# Patient Record
Sex: FEMALE | Race: WHITE | NOT HISPANIC OR LATINO | Employment: OTHER | URBAN - METROPOLITAN AREA
[De-identification: names, ages, dates, MRNs, and addresses within clinical notes are randomized per-mention and may not be internally consistent; named-entity substitution may affect disease eponyms.]

---

## 2022-04-07 ENCOUNTER — HISTORICAL (OUTPATIENT)
Dept: ADMINISTRATIVE | Facility: HOSPITAL | Age: 72
End: 2022-04-07
Payer: COMMERCIAL

## 2022-04-23 VITALS
DIASTOLIC BLOOD PRESSURE: 90 MMHG | HEIGHT: 63 IN | BODY MASS INDEX: 34.84 KG/M2 | WEIGHT: 196.63 LBS | SYSTOLIC BLOOD PRESSURE: 136 MMHG

## 2022-05-23 PROBLEM — E66.9 OBESITY: Status: ACTIVE | Noted: 2022-05-23

## 2022-05-23 PROBLEM — R51.9 DAILY HEADACHE: Chronic | Status: ACTIVE | Noted: 2022-05-23

## 2022-05-23 PROBLEM — R51.9 SINUS HEADACHE: Chronic | Status: ACTIVE | Noted: 2022-05-23

## 2022-05-23 PROBLEM — G25.81 RESTLESS LEGS SYNDROME: Status: ACTIVE | Noted: 2022-05-23

## 2022-05-23 PROBLEM — E66.9 OBESITY: Chronic | Status: ACTIVE | Noted: 2022-05-23

## 2022-05-23 PROBLEM — G25.81 RESTLESS LEGS SYNDROME: Chronic | Status: ACTIVE | Noted: 2022-05-23

## 2022-05-23 PROBLEM — R51.9 SINUS HEADACHE: Status: ACTIVE | Noted: 2022-05-23

## 2022-05-23 PROBLEM — G43.019 COMMON MIGRAINE WITH INTRACTABLE MIGRAINE: Status: ACTIVE | Noted: 2022-05-23

## 2022-05-23 PROBLEM — R51.9 DAILY HEADACHE: Status: ACTIVE | Noted: 2022-05-23

## 2022-05-23 PROBLEM — G43.019 COMMON MIGRAINE WITH INTRACTABLE MIGRAINE: Chronic | Status: ACTIVE | Noted: 2022-05-23

## 2022-07-25 DIAGNOSIS — G43.909 MIGRAINE WITHOUT STATUS MIGRAINOSUS, NOT INTRACTABLE, UNSPECIFIED MIGRAINE TYPE: Primary | ICD-10-CM

## 2022-07-25 RX ORDER — AMITRIPTYLINE HYDROCHLORIDE 50 MG/1
50 TABLET, FILM COATED ORAL NIGHTLY PRN
Qty: 30 TABLET | Refills: 3 | OUTPATIENT
Start: 2022-07-25 | End: 2022-07-25 | Stop reason: SDUPTHER

## 2022-07-25 RX ORDER — AMITRIPTYLINE HYDROCHLORIDE 50 MG/1
50 TABLET, FILM COATED ORAL NIGHTLY PRN
Qty: 30 TABLET | Refills: 4 | OUTPATIENT
Start: 2022-07-25 | End: 2022-08-30 | Stop reason: SDUPTHER

## 2022-08-29 NOTE — PROGRESS NOTES
[facility] Neurology follow-up Office Visit Note    Last Visit Date:  February 14, 2022  Current Visit Date:  08/30/2022    Chief Complaint:     Chief Complaint   Patient presents with    Headache     States headaches are better since b/p meds were increase       History of Present Illness:      This is 72 y.o. female with history of chronic daily headache, mood disorder, hypertension, hypothyroidism, who is referred for chronic migraine without aura, sinus headaches, restless leg syndrome.  Still waking up with a headache in the center of her forehead. Headache is better with BP management.      Patient has been experiencing headaches her entire life.  It is located in the forehead area, bandlike headache in the frontal and maxilla region, daily, constant, with mild phonophobia, without nausea.  I have previously put her on Geodon while she was seeing [facility] because she was reporting visual hallucinations at home.     Not sleeping well.      Headache frequency:  2 migraine headache days per month.     Current medications:  Metoprolol 100 mg twice a day  Amitriptyline 50 mg once at night  Galcanezumab 120 mg her 1 mL once per month (July 12/20/2021 to present) - stopped taking   Sumatriptan 100 mg twice a day as needed for migraine headaches  Pramipexole 1 mg once before bedtime  Tizanidine 2 mg 3 times a day - not taking       Medications:     Current Outpatient Medications on File Prior to Visit   Medication Sig Dispense Refill    amitriptyline (ELAVIL) 50 MG tablet Take 1 tablet (50 mg total) by mouth nightly as needed for Insomnia. 30 tablet 4    chlorhexidine (PERIDEX) 0.12 % solution Take 15 mLs by mouth.      omeprazole (PRILOSEC) 20 MG capsule Take 20 mg by mouth.      pramipexole (MIRAPEX) 1 MG tablet Take 1 mg by mouth.      sumatriptan (IMITREX) 100 MG tablet   See Instructions, TAKE 1 TABLET BY MOUTH EVERY DAY WHEN NEEDED FOR MIGRAINE HEADACHE. MAY REPEAT DOSE AFTER 2 HOURS UP TO A MAXIMUM OF 200MG IN 24 HOURS, #  9 tab(s), 6 Refill(s), Pharmacy: [facility]      tiZANidine 2 mg Cap Take 2 mg by mouth.      triamcinolone acetonide 0.1% (KENALOG) 0.1 % ointment Apply topically.       No current facility-administered medications on file prior to visit.       Labs:     No results found for this or any previous visit.    Studies:       not applicable    Review of Systems:     Review of Systems   All other systems reviewed and are negative.    Physical Exams:     Vitals:    08/30/22 1358   BP: (!) 138/92   Pulse: 78   Resp: 18   Temp: 98.8 °F (37.1 °C)       Physical Exam  Vitals and nursing note reviewed.   Constitutional:       Appearance: Normal appearance.   HENT:      Head: Normocephalic and atraumatic.      Nose: Nose normal.      Mouth/Throat:      Mouth: Mucous membranes are moist.      Pharynx: Oropharynx is clear.   Eyes:      Conjunctiva/sclera: Conjunctivae normal.   Cardiovascular:      Rate and Rhythm: Normal rate and regular rhythm.      Pulses: Normal pulses.   Pulmonary:      Effort: Pulmonary effort is normal.      Breath sounds: Normal breath sounds.   Abdominal:      General: Abdomen is flat.   Musculoskeletal:         General: Normal range of motion.      Cervical back: Normal range of motion.   Skin:     General: Skin is warm.   Neurological:      Mental Status: She is alert.       Comprehensive Neurological Exam:  Mental Status: Alert Oriented to Self, Date, and Place. Naming, repetition, reading, and writing wnl. Comprehension wnl. No dysarthria.   CN II - XII: MJ, VA grossly intact to finger counting at 6 ft, VFFC, No ptosis OU, EOMI without nystagmus, LT/Temp symmetric in CN V1-3 distribution, Hearing grossly intact, Face Symmetric, Tongue and Uvula midline, Trapezius symmetric bilateral.   Motor: tone and bulk wnl throughout, no abnormal involuntary or voluntary movements, 5/5 to confrontation, Fine finger movements wnl b/l, No pronator drift.   Sensory: LT, Proprioception, Vibration, PP, Temp  symmetric. No sensory simultagnosia.   Reflexes: 2+ throughout, plantar reflexes downward bilateral.   Cerebellar: FNF wnl b/l, RAHM wnl b/l  Gait: normal.     Assessment:     This is 72 y.o. female with history of chronic daily headache, mood disorder, hypertension, hypothyroidism, who is referred for medically intractable chronic migraine without aura, sinus headache, restless leg syndrome.   Doing better with increase in Metoprolol 100 mg twice a day.     Problem List Items Addressed This Visit          Neuro    Common migraine with intractable migraine - Primary (Chronic)    Daily headache (Chronic)    Restless legs syndrome (Chronic)    Sinus headache (Chronic)       Endocrine    Obesity (Chronic)       Plan:     [] Continue with pramipexole 1 mg once a night  [] Continue with metoprolol 100 mg twice a day  [] Continue with amitriptyline 50 mg once a night  [] stop tizanidine 2 mg 3 times a day  [] Continue with sumatriptan 100 mg twice a day as needed  [] stop Galcanezumab 120 mg once per month (info regarding savings card provided)        RTC 6 months     I have explained the treatment plan, diagnosis, and prognosis to patient. All questions are answered to the best of my knowledge.     Face to face time 30 minutes, including documentation, chart review, counseling, education, review of test results, relevant medical records, and coordination of care.   I have explained the treatment plan, diagnosis, and prognosis to patient. All questions are answered to the best of my knowledge.       Anjana Webb MD   General Neurology  08/30/2022

## 2022-08-30 ENCOUNTER — OFFICE VISIT (OUTPATIENT)
Dept: NEUROLOGY | Facility: CLINIC | Age: 72
End: 2022-08-30
Payer: COMMERCIAL

## 2022-08-30 VITALS
SYSTOLIC BLOOD PRESSURE: 138 MMHG | DIASTOLIC BLOOD PRESSURE: 92 MMHG | HEIGHT: 63 IN | TEMPERATURE: 99 F | WEIGHT: 229.25 LBS | HEART RATE: 78 BPM | BODY MASS INDEX: 40.62 KG/M2 | OXYGEN SATURATION: 95 % | RESPIRATION RATE: 18 BRPM

## 2022-08-30 DIAGNOSIS — G43.019 COMMON MIGRAINE WITH INTRACTABLE MIGRAINE: Primary | Chronic | ICD-10-CM

## 2022-08-30 DIAGNOSIS — R51.9 DAILY HEADACHE: Chronic | ICD-10-CM

## 2022-08-30 DIAGNOSIS — R51.9 SINUS HEADACHE: Chronic | ICD-10-CM

## 2022-08-30 DIAGNOSIS — G25.81 RESTLESS LEGS SYNDROME: Chronic | ICD-10-CM

## 2022-08-30 DIAGNOSIS — I10 ESSENTIAL HYPERTENSION: Chronic | ICD-10-CM

## 2022-08-30 DIAGNOSIS — G43.909 MIGRAINE WITHOUT STATUS MIGRAINOSUS, NOT INTRACTABLE, UNSPECIFIED MIGRAINE TYPE: ICD-10-CM

## 2022-08-30 DIAGNOSIS — E66.9 OBESITY, UNSPECIFIED CLASSIFICATION, UNSPECIFIED OBESITY TYPE, UNSPECIFIED WHETHER SERIOUS COMORBIDITY PRESENT: Chronic | ICD-10-CM

## 2022-08-30 PROCEDURE — 3080F DIAST BP >= 90 MM HG: CPT | Mod: CPTII,,, | Performed by: PSYCHIATRY & NEUROLOGY

## 2022-08-30 PROCEDURE — 3075F SYST BP GE 130 - 139MM HG: CPT | Mod: CPTII,,, | Performed by: PSYCHIATRY & NEUROLOGY

## 2022-08-30 PROCEDURE — 99214 PR OFFICE/OUTPT VISIT, EST, LEVL IV, 30-39 MIN: ICD-10-PCS | Mod: S$PBB,,, | Performed by: PSYCHIATRY & NEUROLOGY

## 2022-08-30 PROCEDURE — 99214 OFFICE O/P EST MOD 30 MIN: CPT | Mod: PBBFAC | Performed by: PSYCHIATRY & NEUROLOGY

## 2022-08-30 PROCEDURE — 1159F MED LIST DOCD IN RCRD: CPT | Mod: CPTII,,, | Performed by: PSYCHIATRY & NEUROLOGY

## 2022-08-30 PROCEDURE — 3080F PR MOST RECENT DIASTOLIC BLOOD PRESSURE >= 90 MM HG: ICD-10-PCS | Mod: CPTII,,, | Performed by: PSYCHIATRY & NEUROLOGY

## 2022-08-30 PROCEDURE — 3288F PR FALLS RISK ASSESSMENT DOCUMENTED: ICD-10-PCS | Mod: CPTII,,, | Performed by: PSYCHIATRY & NEUROLOGY

## 2022-08-30 PROCEDURE — 1101F PT FALLS ASSESS-DOCD LE1/YR: CPT | Mod: CPTII,,, | Performed by: PSYCHIATRY & NEUROLOGY

## 2022-08-30 PROCEDURE — 99214 OFFICE O/P EST MOD 30 MIN: CPT | Mod: S$PBB,,, | Performed by: PSYCHIATRY & NEUROLOGY

## 2022-08-30 PROCEDURE — 3008F BODY MASS INDEX DOCD: CPT | Mod: CPTII,,, | Performed by: PSYCHIATRY & NEUROLOGY

## 2022-08-30 PROCEDURE — 3008F PR BODY MASS INDEX (BMI) DOCUMENTED: ICD-10-PCS | Mod: CPTII,,, | Performed by: PSYCHIATRY & NEUROLOGY

## 2022-08-30 PROCEDURE — 3288F FALL RISK ASSESSMENT DOCD: CPT | Mod: CPTII,,, | Performed by: PSYCHIATRY & NEUROLOGY

## 2022-08-30 PROCEDURE — 1101F PR PT FALLS ASSESS DOC 0-1 FALLS W/OUT INJ PAST YR: ICD-10-PCS | Mod: CPTII,,, | Performed by: PSYCHIATRY & NEUROLOGY

## 2022-08-30 PROCEDURE — 1159F PR MEDICATION LIST DOCUMENTED IN MEDICAL RECORD: ICD-10-PCS | Mod: CPTII,,, | Performed by: PSYCHIATRY & NEUROLOGY

## 2022-08-30 PROCEDURE — 3075F PR MOST RECENT SYSTOLIC BLOOD PRESS GE 130-139MM HG: ICD-10-PCS | Mod: CPTII,,, | Performed by: PSYCHIATRY & NEUROLOGY

## 2022-08-30 RX ORDER — AMITRIPTYLINE HYDROCHLORIDE 50 MG/1
50 TABLET, FILM COATED ORAL NIGHTLY PRN
Qty: 30 TABLET | Refills: 8 | Status: SHIPPED | OUTPATIENT
Start: 2022-08-30 | End: 2024-02-29 | Stop reason: SDUPTHER

## 2022-08-30 RX ORDER — CYCLOBENZAPRINE HCL 5 MG
5 TABLET ORAL EVERY 8 HOURS PRN
COMMUNITY
Start: 2022-07-18 | End: 2023-02-28 | Stop reason: ALTCHOICE

## 2022-12-27 RX ORDER — SUMATRIPTAN SUCCINATE 100 MG/1
TABLET ORAL
Qty: 9 TABLET | Refills: 6 | Status: SHIPPED | OUTPATIENT
Start: 2022-12-27 | End: 2023-11-10

## 2023-02-27 NOTE — PROGRESS NOTES
[facility] Neurology follow-up Office Visit Note    Last Visit Date:  8/30/2022  Current Visit Date:  02/28/2023    Chief Complaint:     Chief Complaint   Patient presents with    Patient presents today with her migraines getting better    Patient presents today with back pain from previous acciden     History of Present Illness:      This is 72 y.o. female with history of chronic daily headache, mood disorder, hypertension, hypothyroidism, who is referred for chronic migraine without aura, sinus headaches, restless leg syndrome.  During last visit, Galcanezumab 120 mg once per month and tizanidine 2 mg 3 times a day was discontinued.     Patient has been experiencing headaches her entire life.  It is located in the forehead area, bandlike headache in the frontal and maxilla region, daily, constant, with mild phonophobia, without nausea.  I have previously put her on Geodon while she was seeing [facility] because she was reporting visual hallucinations at home.     Not sleeping well.      Headache frequency:  0 migraine headache days per month.     Current medications:  Metoprolol 100 mg twice a day  Amitriptyline 50 mg once at night  Sumatriptan 100 mg twice a day as needed for migraine headaches  Pramipexole 1 mg once before bedtime      Prior Medications:  Galcanezumab 120 mg her 1 mL once per month (July 12/20/2021 to 8/30/2022) - stopped taking   Tizanidine 2 mg 3 times a day    Medications:     Current Outpatient Medications on File Prior to Visit   Medication Sig Dispense Refill    amitriptyline (ELAVIL) 50 MG tablet Take 1 tablet (50 mg total) by mouth nightly as needed for Insomnia. 30 tablet 8    chlorhexidine (PERIDEX) 0.12 % solution Take 15 mLs by mouth.      cyclobenzaprine (FLEXERIL) 5 MG tablet Take 5 mg by mouth every 8 (eight) hours as needed.      omeprazole (PRILOSEC) 20 MG capsule Take 20 mg by mouth.      pramipexole (MIRAPEX) 1 MG tablet Take 1 mg by mouth.      sumatriptan (IMITREX) 100 MG tablet TAKE 1  TABLET BY MOUTH EVERY DAY WHEN NEEDED FOR MIGRAINE HEADACHE. MAY REPEAT DOSE AFTER 2 HOURS UP TO A MAXIMUM OF 200MG IN 24 HOURS 9 tablet 6    triamcinolone acetonide 0.1% (KENALOG) 0.1 % ointment Apply topically.       No current facility-administered medications on file prior to visit.       Labs:     Results for orders placed or performed in visit on 10/04/22   Protime-INR   Result Value Ref Range    PT 12.9 12.5 - 14.5 seconds    INR 0.98 0.00 - 1.30   APTT   Result Value Ref Range    PTT 29.3 23.2 - 33.7 seconds   Fibrinogen   Result Value Ref Range    Fibrinogen 399.0 210.0 - 463.0 mg/dL       Studies:       not applicable    Review of Systems:     Review of Systems   All other systems reviewed and are negative.    Physical Exams:     There were no vitals filed for this visit.      Physical Exam  Vitals and nursing note reviewed.   Constitutional:       Appearance: Normal appearance.   HENT:      Head: Normocephalic and atraumatic.      Nose: Nose normal.      Mouth/Throat:      Mouth: Mucous membranes are moist.      Pharynx: Oropharynx is clear.   Eyes:      Conjunctiva/sclera: Conjunctivae normal.   Cardiovascular:      Rate and Rhythm: Normal rate and regular rhythm.      Pulses: Normal pulses.   Pulmonary:      Effort: Pulmonary effort is normal.      Breath sounds: Normal breath sounds.   Abdominal:      General: Abdomen is flat.   Musculoskeletal:         General: Normal range of motion.      Cervical back: Normal range of motion.   Skin:     General: Skin is warm.   Neurological:      Mental Status: She is alert.       Comprehensive Neurological Exam:  Mental Status: Alert Oriented to Self, Date, and Place. Naming, repetition, reading, and writing wnl. Comprehension wnl. No dysarthria.   CN II - XII: MJ, VA grossly intact to finger counting at 6 ft, VFFC, No ptosis OU, EOMI without nystagmus, LT/Temp symmetric in CN V1-3 distribution, Hearing grossly intact, Face Symmetric, Tongue and Uvula midline,  Trapezius symmetric bilateral.   Motor: tone and bulk wnl throughout, no abnormal involuntary or voluntary movements, 5/5 to confrontation, Fine finger movements wnl b/l, No pronator drift.   Sensory: LT, Proprioception, Vibration, PP, Temp symmetric. No sensory simultagnosia.   Reflexes: 2+ throughout, plantar reflexes downward bilateral.   Cerebellar: FNF wnl b/l, RAHM wnl b/l  Gait: normal.     Assessment:     This is 72 y.o. female with history of chronic daily headache, mood disorder, hypertension, hypothyroidism, who is referred for medically intractable chronic migraine without aura, sinus headache, restless leg syndrome.   Doing better with increase in Metoprolol 100 mg twice a day. Also reports history of osteoarthritis.    Problem List Items Addressed This Visit          Neuro    Restless legs syndrome (Chronic)    Sinus headache (Chronic)       Cardiac/Vascular    Essential hypertension (Chronic)       Endocrine    Obesity (Chronic)     Other Visit Diagnoses       Migraine without status migrainosus, not intractable, unspecified migraine type    -  Primary            Plan:     [] Continue with pramipexole 1 mg once a night  [] Continue with metoprolol 100 mg twice a day  [] Continue with amitriptyline 50 mg once a night  [] Continue with sumatriptan 100 mg twice a day as needed  [] Vitamin D level and DEXA scan: [facility] [place].      RTC 6 months    I have explained the treatment plan, diagnosis, and prognosis to patient. All questions are answered to the best of my knowledge.     Face to face time 30 minutes, including documentation, chart review, counseling, education, review of test results, relevant medical records, and coordination of care.   I have explained the treatment plan, diagnosis, and prognosis to patient. All questions are answered to the best of my knowledge.       Anjana Webb MD   General Neurology  02/28/2023

## 2023-02-28 ENCOUNTER — OFFICE VISIT (OUTPATIENT)
Dept: NEUROLOGY | Facility: CLINIC | Age: 73
End: 2023-02-28
Payer: COMMERCIAL

## 2023-02-28 VITALS
BODY MASS INDEX: 41.04 KG/M2 | OXYGEN SATURATION: 94 % | SYSTOLIC BLOOD PRESSURE: 168 MMHG | HEART RATE: 68 BPM | HEIGHT: 62 IN | WEIGHT: 223 LBS | TEMPERATURE: 98 F | DIASTOLIC BLOOD PRESSURE: 98 MMHG

## 2023-02-28 DIAGNOSIS — E03.9 HYPOTHYROIDISM, UNSPECIFIED TYPE: ICD-10-CM

## 2023-02-28 DIAGNOSIS — R51.9 SINUS HEADACHE: ICD-10-CM

## 2023-02-28 DIAGNOSIS — G25.81 RESTLESS LEGS SYNDROME: ICD-10-CM

## 2023-02-28 DIAGNOSIS — G43.909 MIGRAINE WITHOUT STATUS MIGRAINOSUS, NOT INTRACTABLE, UNSPECIFIED MIGRAINE TYPE: Primary | ICD-10-CM

## 2023-02-28 DIAGNOSIS — E66.9 OBESITY, UNSPECIFIED CLASSIFICATION, UNSPECIFIED OBESITY TYPE, UNSPECIFIED WHETHER SERIOUS COMORBIDITY PRESENT: ICD-10-CM

## 2023-02-28 DIAGNOSIS — I10 ESSENTIAL HYPERTENSION: ICD-10-CM

## 2023-02-28 DIAGNOSIS — M47.9 OSTEOARTHRITIS OF BACK: ICD-10-CM

## 2023-02-28 PROCEDURE — 1159F MED LIST DOCD IN RCRD: CPT | Mod: CPTII,,, | Performed by: PSYCHIATRY & NEUROLOGY

## 2023-02-28 PROCEDURE — 1125F AMNT PAIN NOTED PAIN PRSNT: CPT | Mod: CPTII,,, | Performed by: PSYCHIATRY & NEUROLOGY

## 2023-02-28 PROCEDURE — 3008F PR BODY MASS INDEX (BMI) DOCUMENTED: ICD-10-PCS | Mod: CPTII,,, | Performed by: PSYCHIATRY & NEUROLOGY

## 2023-02-28 PROCEDURE — 99214 PR OFFICE/OUTPT VISIT, EST, LEVL IV, 30-39 MIN: ICD-10-PCS | Mod: S$PBB,,, | Performed by: PSYCHIATRY & NEUROLOGY

## 2023-02-28 PROCEDURE — 3080F DIAST BP >= 90 MM HG: CPT | Mod: CPTII,,, | Performed by: PSYCHIATRY & NEUROLOGY

## 2023-02-28 PROCEDURE — 3080F PR MOST RECENT DIASTOLIC BLOOD PRESSURE >= 90 MM HG: ICD-10-PCS | Mod: CPTII,,, | Performed by: PSYCHIATRY & NEUROLOGY

## 2023-02-28 PROCEDURE — 99214 OFFICE O/P EST MOD 30 MIN: CPT | Mod: S$PBB,,, | Performed by: PSYCHIATRY & NEUROLOGY

## 2023-02-28 PROCEDURE — 99215 OFFICE O/P EST HI 40 MIN: CPT | Mod: PBBFAC | Performed by: PSYCHIATRY & NEUROLOGY

## 2023-02-28 PROCEDURE — 1159F PR MEDICATION LIST DOCUMENTED IN MEDICAL RECORD: ICD-10-PCS | Mod: CPTII,,, | Performed by: PSYCHIATRY & NEUROLOGY

## 2023-02-28 PROCEDURE — 3077F PR MOST RECENT SYSTOLIC BLOOD PRESSURE >= 140 MM HG: ICD-10-PCS | Mod: CPTII,,, | Performed by: PSYCHIATRY & NEUROLOGY

## 2023-02-28 PROCEDURE — 3008F BODY MASS INDEX DOCD: CPT | Mod: CPTII,,, | Performed by: PSYCHIATRY & NEUROLOGY

## 2023-02-28 PROCEDURE — 1125F PR PAIN SEVERITY QUANTIFIED, PAIN PRESENT: ICD-10-PCS | Mod: CPTII,,, | Performed by: PSYCHIATRY & NEUROLOGY

## 2023-02-28 PROCEDURE — 3077F SYST BP >= 140 MM HG: CPT | Mod: CPTII,,, | Performed by: PSYCHIATRY & NEUROLOGY

## 2023-02-28 RX ORDER — FLUTICASONE PROPIONATE 50 MCG
1 SPRAY, SUSPENSION (ML) NASAL
COMMUNITY
Start: 2022-12-26

## 2023-02-28 RX ORDER — METOPROLOL TARTRATE 100 MG/1
50 TABLET ORAL 2 TIMES DAILY
COMMUNITY
Start: 2023-02-23

## 2023-02-28 RX ORDER — LEVOTHYROXINE SODIUM 25 UG/1
25 TABLET ORAL
COMMUNITY
Start: 2023-02-24

## 2023-02-28 RX ORDER — TIZANIDINE 4 MG/1
8 TABLET ORAL EVERY 8 HOURS PRN
COMMUNITY
Start: 2023-01-31

## 2023-02-28 RX ORDER — IPRATROPIUM BROMIDE 42 UG/1
2 SPRAY, METERED NASAL 2 TIMES DAILY
COMMUNITY
Start: 2022-12-26

## 2023-02-28 RX ORDER — LORATADINE 10 MG/1
10 TABLET ORAL DAILY
COMMUNITY

## 2023-02-28 RX ORDER — TEMAZEPAM 15 MG/1
CAPSULE ORAL
COMMUNITY
End: 2024-02-29

## 2023-02-28 RX ORDER — ATORVASTATIN CALCIUM 10 MG/1
10 TABLET, FILM COATED ORAL DAILY
COMMUNITY
Start: 2023-02-23

## 2023-02-28 RX ORDER — BUSPIRONE HYDROCHLORIDE 5 MG/1
5 TABLET ORAL 2 TIMES DAILY
COMMUNITY
Start: 2023-02-16

## 2023-03-02 RX ORDER — PRAMIPEXOLE DIHYDROCHLORIDE 1 MG/1
TABLET ORAL
Qty: 30 TABLET | Refills: 6 | Status: SHIPPED | OUTPATIENT
Start: 2023-03-02 | End: 2023-10-06

## 2023-03-30 ENCOUNTER — TELEPHONE (OUTPATIENT)
Dept: NEUROLOGY | Facility: CLINIC | Age: 73
End: 2023-03-30
Payer: COMMERCIAL

## 2023-03-30 NOTE — TELEPHONE ENCOUNTER
Spoke with pt, reminded her to have lab work done. She informed me she did have it done. I told her that we did not have result. Pt states she will call the lab where she had it done and have it faxed over.

## 2023-08-24 NOTE — PROGRESS NOTES
[facility] Neurology follow-up Office Visit Note    Last Visit Date:  2/28/2023  Current Visit Date:  08/29/2023    Chief Complaint:     Chief Complaint   Patient presents with    Patient presents today with a hx of chronic     Migraines. Patient states she hasn't had to refill her medication because she hasn't been dealing with any migraines recently     History of Present Illness:      This is 73 y.o. female with history of chronic daily headache, mood disorder, hypertension, hypothyroidism, who is referred for chronic migraine without aura, sinus headaches, restless leg syndrome. Had one fall due to trip and fall over a curb. Had another fall upon getting out of bed.      Patient has been experiencing headaches her entire life.  It is located in the forehead area, bandlike headache in the frontal and maxilla region, daily, constant, with mild phonophobia, without nausea.  I have previously put her on Geodon while she was seeing [facility] because she was reporting visual hallucinations at home.     Not sleeping well.      Headache frequency:  0 migraine headache days per month.     Current medications:  Metoprolol 100 mg twice a day  Amitriptyline 50 mg once at night  Sumatriptan 100 mg twice a day as needed for migraine headaches  Pramipexole 1 mg once before bedtime      Prior Medications:  Galcanezumab 120 mg her 1 mL once per month (July 12/20/2021 to 8/30/2022) - stopped taking   Tizanidine 2 mg 3 times a day    Medications:     Current Outpatient Medications on File Prior to Visit   Medication Sig Dispense Refill    amitriptyline (ELAVIL) 50 MG tablet Take 1 tablet (50 mg total) by mouth nightly as needed for Insomnia. 30 tablet 8    atorvastatin (LIPITOR) 10 MG tablet Take 10 mg by mouth.      busPIRone (BUSPAR) 5 MG Tab Take 5 mg by mouth 2 (two) times daily.      fluticasone propionate (FLONASE) 50 mcg/actuation nasal spray 1 spray by Each Nostril route.      ipratropium (ATROVENT) 42 mcg (0.06 %) nasal spray 2  sprays by Each Nostril route 2 (two) times daily.      levothyroxine (SYNTHROID) 25 MCG tablet Take 25 mcg by mouth.      loratadine (CLARITIN) 10 mg tablet loratadine 10 mg tablet      metoprolol tartrate (LOPRESSOR) 100 MG tablet Take 100 mg by mouth 2 (two) times daily.      pramipexole (MIRAPEX) 1 MG tablet TAKE 1 TABLET BY MOUTH DAILY AT BEDTIME 30 tablet 6    sumatriptan (IMITREX) 100 MG tablet TAKE 1 TABLET BY MOUTH EVERY DAY WHEN NEEDED FOR MIGRAINE HEADACHE. MAY REPEAT DOSE AFTER 2 HOURS UP TO A MAXIMUM OF 200MG IN 24 HOURS 9 tablet 6    temazepam (RESTORIL) 15 mg Cap temazepam 15 mg capsule   TAKE 1 CAPSULE BY MOUTH DAILY AT BEDTIME      tiZANidine (ZANAFLEX) 4 MG tablet Take 8 mg by mouth every 8 (eight) hours as needed.       No current facility-administered medications on file prior to visit.       Labs:     Results for orders placed or performed in visit on 10/04/22   Protime-INR   Result Value Ref Range    PT 12.9 12.5 - 14.5 seconds    INR 0.98 0.00 - 1.30   APTT   Result Value Ref Range    PTT 29.3 23.2 - 33.7 seconds   Fibrinogen   Result Value Ref Range    Fibrinogen 399.0 210.0 - 463.0 mg/dL       Studies:       not applicable    Review of Systems:     Review of Systems   All other systems reviewed and are negative.      Physical Exams:     Vitals:    08/29/23 1310   BP: 118/72   Pulse: 67   Temp: 98 °F (36.7 °C)         Physical Exam  Vitals and nursing note reviewed.   Constitutional:       Appearance: Normal appearance.   HENT:      Head: Normocephalic. Contusion present.        Nose: Nose normal.      Mouth/Throat:      Mouth: Mucous membranes are moist.      Pharynx: Oropharynx is clear.   Eyes:      Conjunctiva/sclera: Conjunctivae normal.   Cardiovascular:      Rate and Rhythm: Normal rate and regular rhythm.      Pulses: Normal pulses.   Pulmonary:      Effort: Pulmonary effort is normal.      Breath sounds: Normal breath sounds.   Abdominal:      General: Abdomen is flat.    Musculoskeletal:         General: Normal range of motion.      Cervical back: Normal range of motion.   Skin:     General: Skin is warm.   Neurological:      Mental Status: She is alert.         Comprehensive Neurological Exam:  Mental Status: Alert Oriented to Self, Date, and Place. Naming, repetition, reading, and writing wnl. Comprehension wnl. No dysarthria.   CN II - XII: MJ, VA grossly intact to finger counting at 6 ft, VFFC, No ptosis OU, EOMI without nystagmus, LT/Temp symmetric in CN V1-3 distribution, Hearing grossly intact, Face Symmetric, Tongue and Uvula midline, Trapezius symmetric bilateral.   Motor: tone and bulk wnl throughout, no abnormal involuntary or voluntary movements, 5/5 to confrontation, Fine finger movements wnl b/l, No pronator drift.   Sensory: LT, Proprioception, Vibration, PP, Temp symmetric. No sensory simultagnosia.   Reflexes: 2+ throughout, plantar reflexes downward bilateral.   Cerebellar: FNF wnl b/l, RAHM wnl b/l  Gait: normal.     Assessment:     This is 73 y.o. female with history of chronic daily headache, mood disorder, hypertension, hypothyroidism, who is referred for medically intractable chronic migraine without aura, sinus headache, restless leg syndrome.   Doing better with increase in Metoprolol 100 mg twice a day. Also reports history of osteoarthritis.    Problem List Items Addressed This Visit    None  Visit Diagnoses       Migraine without status migrainosus, not intractable, unspecified migraine type    -  Primary              Plan:     [] Continue with pramipexole 1 mg once a night  [] Continue with metoprolol 100 mg twice a day  [] Continue with amitriptyline 50 mg once a night  [] Continue with sumatriptan 100 mg twice a day as needed    RTC 6 months    I have explained the treatment plan, diagnosis, and prognosis to patient. All questions are answered to the best of my knowledge.     Face to face time 30 minutes, including documentation, chart review,  counseling, education, review of test results, relevant medical records, and coordination of care.   I have explained the treatment plan, diagnosis, and prognosis to patient. All questions are answered to the best of my knowledge.       Anjana Webb MD   General Neurology  08/29/2023

## 2023-08-29 ENCOUNTER — OFFICE VISIT (OUTPATIENT)
Dept: NEUROLOGY | Facility: CLINIC | Age: 73
End: 2023-08-29
Payer: COMMERCIAL

## 2023-08-29 VITALS
WEIGHT: 214.81 LBS | BODY MASS INDEX: 39.53 KG/M2 | SYSTOLIC BLOOD PRESSURE: 118 MMHG | HEART RATE: 67 BPM | TEMPERATURE: 98 F | HEIGHT: 62 IN | DIASTOLIC BLOOD PRESSURE: 72 MMHG | OXYGEN SATURATION: 95 %

## 2023-08-29 DIAGNOSIS — G43.909 MIGRAINE WITHOUT STATUS MIGRAINOSUS, NOT INTRACTABLE, UNSPECIFIED MIGRAINE TYPE: Primary | ICD-10-CM

## 2023-08-29 PROCEDURE — 1100F PR PT FALLS ASSESS DOC 2+ FALLS/FALL W/INJURY/YR: ICD-10-PCS | Mod: CPTII,,, | Performed by: PSYCHIATRY & NEUROLOGY

## 2023-08-29 PROCEDURE — 3074F PR MOST RECENT SYSTOLIC BLOOD PRESSURE < 130 MM HG: ICD-10-PCS | Mod: CPTII,,, | Performed by: PSYCHIATRY & NEUROLOGY

## 2023-08-29 PROCEDURE — 3074F SYST BP LT 130 MM HG: CPT | Mod: CPTII,,, | Performed by: PSYCHIATRY & NEUROLOGY

## 2023-08-29 PROCEDURE — 3078F PR MOST RECENT DIASTOLIC BLOOD PRESSURE < 80 MM HG: ICD-10-PCS | Mod: CPTII,,, | Performed by: PSYCHIATRY & NEUROLOGY

## 2023-08-29 PROCEDURE — 99214 PR OFFICE/OUTPT VISIT, EST, LEVL IV, 30-39 MIN: ICD-10-PCS | Mod: S$PBB,,, | Performed by: PSYCHIATRY & NEUROLOGY

## 2023-08-29 PROCEDURE — 3078F DIAST BP <80 MM HG: CPT | Mod: CPTII,,, | Performed by: PSYCHIATRY & NEUROLOGY

## 2023-08-29 PROCEDURE — 1159F MED LIST DOCD IN RCRD: CPT | Mod: CPTII,,, | Performed by: PSYCHIATRY & NEUROLOGY

## 2023-08-29 PROCEDURE — 3288F FALL RISK ASSESSMENT DOCD: CPT | Mod: CPTII,,, | Performed by: PSYCHIATRY & NEUROLOGY

## 2023-08-29 PROCEDURE — 99215 OFFICE O/P EST HI 40 MIN: CPT | Mod: PBBFAC | Performed by: PSYCHIATRY & NEUROLOGY

## 2023-08-29 PROCEDURE — 1125F AMNT PAIN NOTED PAIN PRSNT: CPT | Mod: CPTII,,, | Performed by: PSYCHIATRY & NEUROLOGY

## 2023-08-29 PROCEDURE — 3288F PR FALLS RISK ASSESSMENT DOCUMENTED: ICD-10-PCS | Mod: CPTII,,, | Performed by: PSYCHIATRY & NEUROLOGY

## 2023-08-29 PROCEDURE — 1125F PR PAIN SEVERITY QUANTIFIED, PAIN PRESENT: ICD-10-PCS | Mod: CPTII,,, | Performed by: PSYCHIATRY & NEUROLOGY

## 2023-08-29 PROCEDURE — 99214 OFFICE O/P EST MOD 30 MIN: CPT | Mod: S$PBB,,, | Performed by: PSYCHIATRY & NEUROLOGY

## 2023-08-29 PROCEDURE — 3008F BODY MASS INDEX DOCD: CPT | Mod: CPTII,,, | Performed by: PSYCHIATRY & NEUROLOGY

## 2023-08-29 PROCEDURE — 1159F PR MEDICATION LIST DOCUMENTED IN MEDICAL RECORD: ICD-10-PCS | Mod: CPTII,,, | Performed by: PSYCHIATRY & NEUROLOGY

## 2023-08-29 PROCEDURE — 3008F PR BODY MASS INDEX (BMI) DOCUMENTED: ICD-10-PCS | Mod: CPTII,,, | Performed by: PSYCHIATRY & NEUROLOGY

## 2023-08-29 PROCEDURE — 1100F PTFALLS ASSESS-DOCD GE2>/YR: CPT | Mod: CPTII,,, | Performed by: PSYCHIATRY & NEUROLOGY

## 2023-08-29 RX ORDER — OMEPRAZOLE 20 MG/1
20 CAPSULE, DELAYED RELEASE ORAL DAILY PRN
COMMUNITY
Start: 2023-08-09

## 2023-08-29 RX ORDER — AMLODIPINE BESYLATE 5 MG/1
5 TABLET ORAL DAILY
COMMUNITY
Start: 2023-08-03

## 2023-08-29 RX ORDER — ESTRADIOL 10 UG/1
1 INSERT VAGINAL
COMMUNITY
Start: 2023-08-21

## 2023-08-29 RX ORDER — KETOROLAC TROMETHAMINE 10 MG/1
TABLET, FILM COATED ORAL
COMMUNITY
End: 2024-02-29

## 2023-08-29 RX ORDER — CLOTRIMAZOLE AND BETAMETHASONE DIPROPIONATE 10; .64 MG/G; MG/G
1 CREAM TOPICAL
COMMUNITY
End: 2024-02-29

## 2023-08-29 RX ORDER — NYSTATIN 100000 U/G
1 OINTMENT TOPICAL
COMMUNITY
End: 2024-02-29

## 2023-08-29 RX ORDER — MELOXICAM 15 MG/1
15 TABLET ORAL 3 TIMES DAILY
COMMUNITY
Start: 2023-03-31 | End: 2024-02-29

## 2023-08-29 RX ORDER — ALENDRONATE SODIUM 70 MG/1
70 TABLET ORAL
COMMUNITY
Start: 2023-08-03

## 2023-08-29 RX ORDER — TRIAMCINOLONE ACETONIDE 1 MG/G
OINTMENT TOPICAL
COMMUNITY
End: 2024-02-29

## 2023-08-29 RX ORDER — CELECOXIB 200 MG/1
200 CAPSULE ORAL 2 TIMES DAILY
COMMUNITY
Start: 2023-08-18 | End: 2024-02-29

## 2023-08-29 RX ORDER — FUROSEMIDE 40 MG/1
40 TABLET ORAL DAILY
COMMUNITY
Start: 2023-08-09

## 2023-08-29 RX ORDER — TRAMADOL HYDROCHLORIDE 50 MG/1
50 TABLET ORAL EVERY 6 HOURS PRN
COMMUNITY
Start: 2023-08-18 | End: 2024-02-29

## 2023-10-06 RX ORDER — PRAMIPEXOLE DIHYDROCHLORIDE 1 MG/1
TABLET ORAL
Qty: 30 TABLET | Refills: 6 | Status: SHIPPED | OUTPATIENT
Start: 2023-10-06

## 2023-11-10 RX ORDER — SUMATRIPTAN SUCCINATE 100 MG/1
TABLET ORAL
Qty: 9 TABLET | Refills: 6 | Status: SHIPPED | OUTPATIENT
Start: 2023-11-10

## 2024-02-27 NOTE — PROGRESS NOTES
[facility] Neurology follow-up Office Visit Note    Last Visit Date:  8/29/2023  Current Visit Date:  02/29/2024    Chief Complaint:     Chief Complaint   Patient presents with    Migraine     States has about 2/week     History of Present Illness:      This is 73 y.o. female with history of chronic daily headache, mood disorder, hypertension, hypothyroidism, who is referred for chronic migraine without aura, sinus headaches, restless leg syndrome. Doing better mpw/      Patient has been experiencing headaches her entire life.  It is located in the forehead area, bandlike headache in the frontal and maxilla region, daily, constant, with mild phonophobia, without nausea.  I have previously put her on Geodon while she was seeing [facility] [place] because she was reporting visual hallucinations at home.     Not sleeping well.     Headache frequency:  8 migraine headache days per month.     Current medications:  Metoprolol 100 mg twice a day  Amitriptyline 50 mg once at night  Sumatriptan 100 mg twice a day as needed for migraine headaches  Pramipexole 1 mg once before bedtime      Prior Medications:  Galcanezumab 120 mg her 1 mL once per month (July 12/20/2021 to 8/30/2022) - stopped taking   Tizanidine 2 mg 3 times a day    Medications:     Current Outpatient Medications on File Prior to Visit   Medication Sig Dispense Refill    alendronate (FOSAMAX) 70 MG tablet Take 70 mg by mouth.      amitriptyline (ELAVIL) 50 MG tablet Take 1 tablet (50 mg total) by mouth nightly as needed for Insomnia. 30 tablet 8    amLODIPine (NORVASC) 5 MG tablet Take 5 mg by mouth.      atorvastatin (LIPITOR) 10 MG tablet Take 10 mg by mouth.      busPIRone (BUSPAR) 5 MG Tab Take 5 mg by mouth 2 (two) times daily.      celecoxib (CELEBREX) 200 MG capsule Take 200 mg by mouth 2 (two) times daily.      clotrimazole-betamethasone 1-0.05% (LOTRISONE) cream 1 Application.      fluticasone propionate (FLONASE) 50 mcg/actuation nasal spray 1 spray by Each Nostril  route.      furosemide (LASIX) 40 MG tablet Take 40 mg by mouth.      IMVEXXY MAINTENANCE PACK 10 mcg Inst Place 1 capsule vaginally twice a week.      ipratropium (ATROVENT) 42 mcg (0.06 %) nasal spray 2 sprays by Each Nostril route 2 (two) times daily.      ketorolac (TORADOL) 10 mg tablet TAKE 1 TABLET BY MOUTH FOUR TIMES A DAY; START ON 8/7/2022 for 4      levothyroxine (SYNTHROID) 25 MCG tablet Take 25 mcg by mouth.      loratadine (CLARITIN) 10 mg tablet loratadine 10 mg tablet      meloxicam (MOBIC) 15 MG tablet Take 15 mg by mouth 3 (three) times daily.      metoprolol tartrate (LOPRESSOR) 100 MG tablet Take 100 mg by mouth 2 (two) times daily.      nystatin (MYCOSTATIN) ointment 1 Application.      omeprazole (PRILOSEC) 20 MG capsule Take 20 mg by mouth daily as needed.      pramipexole (MIRAPEX) 1 MG tablet TAKE 1 TABLET BY MOUTH DAILY AT BEDTIME 30 tablet 6    sumatriptan (IMITREX) 100 MG tablet TAKE 1 TABLET BY MOUTH EVERY DAY WHEN NEEDED FOR MIGRAINE HEADACHE. MAY REPEAT DOSE AFTER 2 HOURS UP TO A MAXIMUM OF 200MG IN 24 HOURS 9 tablet 6    temazepam (RESTORIL) 15 mg Cap temazepam 15 mg capsule   TAKE 1 CAPSULE BY MOUTH DAILY AT BEDTIME      tiZANidine (ZANAFLEX) 4 MG tablet Take 8 mg by mouth every 8 (eight) hours as needed.      traMADoL (ULTRAM) 50 mg tablet Take 50 mg by mouth every 6 (six) hours as needed.      triamcinolone acetonide 0.1% (KENALOG) 0.1 % ointment APPLY TWICE A DAY AS NEEDED for 30       No current facility-administered medications on file prior to visit.       Labs:     Results for orders placed or performed in visit on 10/04/22   Protime-INR   Result Value Ref Range    PT 12.9 12.5 - 14.5 seconds    INR 0.98 0.00 - 1.30   APTT   Result Value Ref Range    PTT 29.3 23.2 - 33.7 seconds   Fibrinogen   Result Value Ref Range    Fibrinogen 399.0 210.0 - 463.0 mg/dL       Studies:       not applicable    Review of Systems:     Review of Systems   All other systems reviewed and are  negative.      Physical Exams:     Vitals:    02/29/24 1038   BP: 131/87   Pulse: 72   Resp: 12   Temp: 98 °F (36.7 °C)           Physical Exam  Vitals and nursing note reviewed.   Constitutional:       Appearance: Normal appearance.   HENT:      Head: Normocephalic.      Nose: Nose normal.      Mouth/Throat:      Mouth: Mucous membranes are moist.      Pharynx: Oropharynx is clear.   Eyes:      Conjunctiva/sclera: Conjunctivae normal.   Cardiovascular:      Rate and Rhythm: Normal rate and regular rhythm.      Pulses: Normal pulses.   Pulmonary:      Effort: Pulmonary effort is normal.      Breath sounds: Normal breath sounds.   Abdominal:      General: Abdomen is flat.   Musculoskeletal:         General: Normal range of motion.      Cervical back: Normal range of motion.   Skin:     General: Skin is warm.   Neurological:      Mental Status: She is alert.         Comprehensive Neurological Exam:  Mental Status: Alert Oriented to Self, Date, and Place. Naming, repetition, reading, and writing wnl. Comprehension wnl. No dysarthria.   CN II - XII: MJ, VA grossly intact to finger counting at 6 ft, VFFC, No ptosis OU, EOMI without nystagmus, LT/Temp symmetric in CN V1-3 distribution, Hearing grossly intact, Face Symmetric, Tongue and Uvula midline, Trapezius symmetric bilateral.   Motor: tone and bulk wnl throughout, no abnormal involuntary or voluntary movements, 5/5 to confrontation, Fine finger movements wnl b/l, No pronator drift.   Sensory: LT, Proprioception, Vibration, PP, Temp symmetric. No sensory simultagnosia.   Reflexes: 2+ throughout, plantar reflexes downward bilateral.   Cerebellar: FNF wnl b/l, RAHM wnl b/l  Gait: normal.     Assessment:     This is 73 y.o. female with history of chronic daily headache, mood disorder, hypertension, hypothyroidism, who is referred for medically intractable chronic migraine without aura, sinus headache, restless leg syndrome.   Doing better with increase in Metoprolol  100 mg twice a day. Also reports history of osteoarthritis.    Problem List Items Addressed This Visit          Neuro    Migraine without status migrainosus, not intractable - Primary       Cardiac/Vascular    Essential hypertension (Chronic)     Other Visit Diagnoses       Hypothyroidism, unspecified type                    Plan:     [] Continue with pramipexole 1 mg once a night  [] Continue with metoprolol 100 mg twice a day  [] Continue with amitriptyline 50 mg once a night  [] Continue with sumatriptan 100 mg twice a day as needed    RTC 12 months    I have explained the treatment plan, diagnosis, and prognosis to patient. All questions are answered to the best of my knowledge.     Face to face time 30 minutes, including documentation, chart review, counseling, education, review of test results, relevant medical records, and coordination of care.   I have explained the treatment plan, diagnosis, and prognosis to patient. All questions are answered to the best of my knowledge.       Anjana Webb MD   General Neurology  02/29/2024

## 2024-02-29 ENCOUNTER — OFFICE VISIT (OUTPATIENT)
Dept: NEUROLOGY | Facility: CLINIC | Age: 74
End: 2024-02-29
Payer: COMMERCIAL

## 2024-02-29 VITALS
WEIGHT: 218.38 LBS | BODY MASS INDEX: 40.19 KG/M2 | HEART RATE: 72 BPM | OXYGEN SATURATION: 97 % | TEMPERATURE: 98 F | RESPIRATION RATE: 12 BRPM | DIASTOLIC BLOOD PRESSURE: 87 MMHG | HEIGHT: 62 IN | SYSTOLIC BLOOD PRESSURE: 131 MMHG

## 2024-02-29 DIAGNOSIS — I10 ESSENTIAL HYPERTENSION: ICD-10-CM

## 2024-02-29 DIAGNOSIS — G43.909 MIGRAINE WITHOUT STATUS MIGRAINOSUS, NOT INTRACTABLE, UNSPECIFIED MIGRAINE TYPE: Primary | ICD-10-CM

## 2024-02-29 DIAGNOSIS — E03.9 HYPOTHYROIDISM, UNSPECIFIED TYPE: ICD-10-CM

## 2024-02-29 PROCEDURE — 99214 OFFICE O/P EST MOD 30 MIN: CPT | Mod: S$PBB,,, | Performed by: PSYCHIATRY & NEUROLOGY

## 2024-02-29 PROCEDURE — 3079F DIAST BP 80-89 MM HG: CPT | Mod: CPTII,,, | Performed by: PSYCHIATRY & NEUROLOGY

## 2024-02-29 PROCEDURE — 3008F BODY MASS INDEX DOCD: CPT | Mod: CPTII,,, | Performed by: PSYCHIATRY & NEUROLOGY

## 2024-02-29 PROCEDURE — 3075F SYST BP GE 130 - 139MM HG: CPT | Mod: CPTII,,, | Performed by: PSYCHIATRY & NEUROLOGY

## 2024-02-29 PROCEDURE — 99214 OFFICE O/P EST MOD 30 MIN: CPT | Mod: PBBFAC | Performed by: PSYCHIATRY & NEUROLOGY

## 2024-02-29 PROCEDURE — 1159F MED LIST DOCD IN RCRD: CPT | Mod: CPTII,,, | Performed by: PSYCHIATRY & NEUROLOGY

## 2024-02-29 RX ORDER — NAPROXEN 500 MG/1
500 TABLET ORAL 2 TIMES DAILY PRN
COMMUNITY
Start: 2024-02-07 | End: 2024-02-29

## 2024-02-29 RX ORDER — AMITRIPTYLINE HYDROCHLORIDE 50 MG/1
50 TABLET, FILM COATED ORAL NIGHTLY PRN
Qty: 30 TABLET | Refills: 11 | Status: SHIPPED | OUTPATIENT
Start: 2024-02-29 | End: 2024-02-29 | Stop reason: SDUPTHER

## 2024-02-29 RX ORDER — AMITRIPTYLINE HYDROCHLORIDE 50 MG/1
50 TABLET, FILM COATED ORAL NIGHTLY PRN
Qty: 30 TABLET | Refills: 11 | Status: SHIPPED | OUTPATIENT
Start: 2024-02-29 | End: 2024-08-27

## 2024-05-09 RX ORDER — PRAMIPEXOLE DIHYDROCHLORIDE 1 MG/1
TABLET ORAL
Qty: 30 TABLET | Refills: 6 | Status: SHIPPED | OUTPATIENT
Start: 2024-05-09

## 2024-07-29 RX ORDER — SUMATRIPTAN SUCCINATE 100 MG/1
TABLET ORAL
Qty: 9 TABLET | Refills: 6 | Status: SHIPPED | OUTPATIENT
Start: 2024-07-29

## 2024-12-20 RX ORDER — PRAMIPEXOLE DIHYDROCHLORIDE 1 MG/1
TABLET ORAL
Qty: 30 TABLET | Refills: 6 | Status: SHIPPED | OUTPATIENT
Start: 2024-12-20

## 2025-02-28 NOTE — PROGRESS NOTES
[facility] Neurology Follow Up  Office Visit Note    Last Visit Date:  2/29/2024  Current Visit Date:  03/05/2025    Chief Complaint:     Chief Complaint   Patient presents with    Migraine     No complaints.  No migraines     History of Present Illness:      This is 74 y.o. female with history of chronic daily headache, mood disorder, hypertension, hypothyroidism, who is referred for chronic migraine without aura, sinus headaches, restless leg syndrome.      Patient has been experiencing headaches her entire life.  It is located in the forehead area, bandlike headache in the frontal and maxilla region, daily, constant, with mild phonophobia, without nausea.  I have previously put her on Geodon while she was seeing [facility] [place] because she was reporting visual hallucinations at home.     Not sleeping well.     Headache frequency:  0 migraine headache days per month.     Current medications:  Metoprolol 100 mg twice a day  Amitriptyline 50 mg once at night  Sumatriptan 100 mg twice a day as needed for migraine headaches  Pramipexole 1 mg once before bedtime      Prior Medications:  Galcanezumab 120 mg her 1 mL once per month (July 12/20/2021 to 8/30/2022) - stopped taking   Tizanidine 2 mg 3 times a day    Medications:     Current Outpatient Medications on File Prior to Visit   Medication Sig Dispense Refill    alendronate (FOSAMAX) 70 MG tablet Take 70 mg by mouth.      amitriptyline (ELAVIL) 50 MG tablet Take 1 tablet (50 mg total) by mouth nightly as needed for Insomnia. 30 tablet 11    amLODIPine (NORVASC) 5 MG tablet Take 5 mg by mouth once daily.      atorvastatin (LIPITOR) 10 MG tablet Take 10 mg by mouth once daily.      busPIRone (BUSPAR) 5 MG Tab Take 5 mg by mouth 2 (two) times daily.      fluticasone propionate (FLONASE) 50 mcg/actuation nasal spray 1 spray by Each Nostril route.      furosemide (LASIX) 40 MG tablet Take 40 mg by mouth once daily.      IMVEXXY MAINTENANCE PACK 10 mcg Inst Place 1 capsule vaginally twice  a week.      ipratropium (ATROVENT) 42 mcg (0.06 %) nasal spray 2 sprays by Each Nostril route 2 (two) times daily.      levothyroxine (SYNTHROID) 25 MCG tablet Take 25 mcg by mouth before breakfast.      loratadine (CLARITIN) 10 mg tablet Take 10 mg by mouth once daily.      metoprolol tartrate (LOPRESSOR) 100 MG tablet Take 50 mg by mouth 2 (two) times daily.      omeprazole (PRILOSEC) 20 MG capsule Take 20 mg by mouth daily as needed.      pramipexole (MIRAPEX) 1 MG tablet TAKE 1 TABLET BY MOUTH DAILY AT BEDTIME 30 tablet 6    sumatriptan (IMITREX) 100 MG tablet TAKE 1 TABLET BY MOUTH EVERY DAY WHEN NEEDED FOR MIGRAINE HEADACHE. MAY REPEAT DOSE AFTER 2 HOURS UP TO A MAXIMUM OF 200MG IN 24 HOURS 9 tablet 6    tiZANidine (ZANAFLEX) 4 MG tablet Take 8 mg by mouth every 8 (eight) hours as needed.       No current facility-administered medications on file prior to visit.       Labs:     Results for orders placed or performed in visit on 10/04/22   Protime-INR    Collection Time: 10/04/22 11:06 AM   Result Value Ref Range    PT 12.9 12.5 - 14.5 seconds    INR 0.98 0.00 - 1.30   APTT    Collection Time: 10/04/22 11:06 AM   Result Value Ref Range    PTT 29.3 23.2 - 33.7 seconds   Fibrinogen    Collection Time: 10/04/22 11:06 AM   Result Value Ref Range    Fibrinogen 399.0 210.0 - 463.0 mg/dL       Studies:       not applicable    Review of Systems:     Review of Systems   All other systems reviewed and are negative.      Physical Exams:     Vitals:    03/05/25 1035   BP: 128/82   Pulse: 73   Resp: 16   Temp: 97.8 °F (36.6 °C)         Physical Exam  Vitals and nursing note reviewed.   Constitutional:       Appearance: Normal appearance.   HENT:      Head: Normocephalic.      Nose: Nose normal.      Mouth/Throat:      Mouth: Mucous membranes are moist.      Pharynx: Oropharynx is clear.   Eyes:      Conjunctiva/sclera: Conjunctivae normal.   Cardiovascular:      Rate and Rhythm: Normal rate and regular rhythm.      Pulses:  Normal pulses.   Pulmonary:      Effort: Pulmonary effort is normal.      Breath sounds: Normal breath sounds.   Abdominal:      General: Abdomen is flat.   Musculoskeletal:         General: Normal range of motion.      Cervical back: Normal range of motion.   Skin:     General: Skin is warm.   Neurological:      Mental Status: She is alert.         Comprehensive Neurological Exam:  Mental Status: Alert Oriented to Self, Date, and Place. Naming, repetition, reading, and writing wnl. Comprehension wnl. No dysarthria.   CN II - XII: MJ, VA grossly intact to finger counting at 6 ft, VFFC, No ptosis OU, EOMI without nystagmus, LT/Temp symmetric in CN V1-3 distribution, Hearing grossly intact, Face Symmetric, Tongue and Uvula midline, Trapezius symmetric bilateral.   Motor: tone and bulk wnl throughout, no abnormal involuntary or voluntary movements, 5/5 to confrontation, Fine finger movements wnl b/l, No pronator drift.   Sensory: LT, Proprioception, Vibration, PP, Temp symmetric. No sensory simultagnosia.   Reflexes: 2+ throughout, plantar reflexes downward bilateral.   Cerebellar: FNF wnl b/l, RAHM wnl b/l  Gait: normal.     Assessment:     This is 74 y.o. female with history of chronic daily headache, mood disorder, hypertension, hypothyroidism, who is referred for medically intractable chronic migraine without aura, sinus headache, restless leg syndrome.     Problem List Items Addressed This Visit          Neuro    Migraine without status migrainosus, not intractable - Primary           Plan:     [] Continue with pramipexole 1 mg once a night  [] Continue with metoprolol 100 mg twice a day  [] Continue with amitriptyline 50 mg once a night  [] Continue with sumatriptan 100 mg twice a day as needed    RTC 12 months    Visit today is associated with current or anticipated ongoing medical care related to this patient's single serious condition/complex condition as documented above.     I have explained the treatment  plan, diagnosis, and prognosis to patient. All questions are answered to the best of my knowledge.     Face to face time 30 minutes, including documentation, chart review, counseling, education, review of test results, relevant medical records, and coordination of care.   I have explained the treatment plan, diagnosis, and prognosis to patient. All questions are answered to the best of my knowledge.       Anjana Webb MD   General Neurology  03/05/2025

## 2025-03-05 ENCOUNTER — OFFICE VISIT (OUTPATIENT)
Dept: NEUROLOGY | Facility: CLINIC | Age: 75
End: 2025-03-05
Payer: COMMERCIAL

## 2025-03-05 VITALS
DIASTOLIC BLOOD PRESSURE: 82 MMHG | SYSTOLIC BLOOD PRESSURE: 128 MMHG | RESPIRATION RATE: 16 BRPM | HEIGHT: 63 IN | OXYGEN SATURATION: 95 % | WEIGHT: 225 LBS | BODY MASS INDEX: 39.87 KG/M2 | TEMPERATURE: 98 F | HEART RATE: 73 BPM

## 2025-03-05 DIAGNOSIS — G25.81 RESTLESS LEGS SYNDROME: Chronic | ICD-10-CM

## 2025-03-05 DIAGNOSIS — G43.909 MIGRAINE WITHOUT STATUS MIGRAINOSUS, NOT INTRACTABLE, UNSPECIFIED MIGRAINE TYPE: Primary | ICD-10-CM

## 2025-03-05 PROCEDURE — G2211 COMPLEX E/M VISIT ADD ON: HCPCS | Mod: S$PBB,,, | Performed by: PSYCHIATRY & NEUROLOGY

## 2025-03-05 PROCEDURE — 1159F MED LIST DOCD IN RCRD: CPT | Mod: CPTII,,, | Performed by: PSYCHIATRY & NEUROLOGY

## 2025-03-05 PROCEDURE — 99214 OFFICE O/P EST MOD 30 MIN: CPT | Mod: S$PBB,,, | Performed by: PSYCHIATRY & NEUROLOGY

## 2025-03-05 PROCEDURE — 3288F FALL RISK ASSESSMENT DOCD: CPT | Mod: CPTII,,, | Performed by: PSYCHIATRY & NEUROLOGY

## 2025-03-05 PROCEDURE — 1101F PT FALLS ASSESS-DOCD LE1/YR: CPT | Mod: CPTII,,, | Performed by: PSYCHIATRY & NEUROLOGY

## 2025-03-05 PROCEDURE — 3008F BODY MASS INDEX DOCD: CPT | Mod: CPTII,,, | Performed by: PSYCHIATRY & NEUROLOGY

## 2025-03-05 PROCEDURE — 3074F SYST BP LT 130 MM HG: CPT | Mod: CPTII,,, | Performed by: PSYCHIATRY & NEUROLOGY

## 2025-03-05 PROCEDURE — 3079F DIAST BP 80-89 MM HG: CPT | Mod: CPTII,,, | Performed by: PSYCHIATRY & NEUROLOGY

## 2025-03-05 PROCEDURE — 99214 OFFICE O/P EST MOD 30 MIN: CPT | Mod: PBBFAC | Performed by: PSYCHIATRY & NEUROLOGY

## 2025-03-05 RX ORDER — AMITRIPTYLINE HYDROCHLORIDE 50 MG/1
50 TABLET, FILM COATED ORAL NIGHTLY PRN
Qty: 180 TABLET | Refills: 0 | Status: SHIPPED | OUTPATIENT
Start: 2025-03-05 | End: 2025-09-01

## 2025-03-05 RX ORDER — SUMATRIPTAN SUCCINATE 100 MG/1
100 TABLET ORAL
Qty: 9 TABLET | Refills: 6 | Status: SHIPPED | OUTPATIENT
Start: 2025-03-05 | End: 2025-04-04

## 2025-03-05 RX ORDER — CELECOXIB 200 MG/1
200 CAPSULE ORAL 2 TIMES DAILY
COMMUNITY

## 2025-03-05 RX ORDER — METOPROLOL TARTRATE 100 MG/1
100 TABLET ORAL 2 TIMES DAILY
Qty: 180 TABLET | Refills: 1 | Status: SHIPPED | OUTPATIENT
Start: 2025-03-05 | End: 2025-09-01

## 2025-03-05 RX ORDER — PRAMIPEXOLE DIHYDROCHLORIDE 1 MG/1
1 TABLET ORAL NIGHTLY
Qty: 30 TABLET | Refills: 6 | Status: SHIPPED | OUTPATIENT
Start: 2025-03-05

## 2025-08-19 DIAGNOSIS — G43.909 MIGRAINE WITHOUT STATUS MIGRAINOSUS, NOT INTRACTABLE, UNSPECIFIED MIGRAINE TYPE: ICD-10-CM

## 2025-08-19 RX ORDER — METOPROLOL TARTRATE 100 MG/1
100 TABLET ORAL 2 TIMES DAILY
Qty: 180 TABLET | Refills: 1 | Status: SHIPPED | OUTPATIENT
Start: 2025-08-19